# Patient Record
Sex: FEMALE | URBAN - NONMETROPOLITAN AREA
[De-identification: names, ages, dates, MRNs, and addresses within clinical notes are randomized per-mention and may not be internally consistent; named-entity substitution may affect disease eponyms.]

---

## 2022-12-15 ENCOUNTER — APPOINTMENT (OUTPATIENT)
Dept: URBAN - NONMETROPOLITAN AREA CLINIC 55 | Age: 27
Setting detail: DERMATOLOGY
End: 2022-12-15

## 2022-12-15 DIAGNOSIS — Z71.89 OTHER SPECIFIED COUNSELING: ICD-10-CM

## 2022-12-15 DIAGNOSIS — L23.9 ALLERGIC CONTACT DERMATITIS, UNSPECIFIED CAUSE: ICD-10-CM

## 2022-12-15 PROCEDURE — OTHER COUNSELING: OTHER

## 2022-12-15 PROCEDURE — OTHER MEDICATION COUNSELING: OTHER

## 2022-12-15 PROCEDURE — 99203 OFFICE O/P NEW LOW 30 MIN: CPT

## 2022-12-15 PROCEDURE — OTHER MIPS QUALITY: OTHER

## 2022-12-15 PROCEDURE — OTHER PATIENT SPECIFIC COUNSELING: OTHER

## 2022-12-15 PROCEDURE — OTHER PRESCRIPTION: OTHER

## 2022-12-15 RX ORDER — TRIAMCINOLONE ACETONIDE 1 MG/G
CREAM TOPICAL
Qty: 454 | Refills: 1 | Status: ERX | COMMUNITY
Start: 2022-12-15

## 2022-12-15 ASSESSMENT — LOCATION ZONE DERM
LOCATION ZONE: NECK
LOCATION ZONE: TRUNK
LOCATION ZONE: LEG

## 2022-12-15 ASSESSMENT — LOCATION DETAILED DESCRIPTION DERM
LOCATION DETAILED: RIGHT ANTERIOR PROXIMAL THIGH
LOCATION DETAILED: RIGHT SUPERIOR ANTERIOR NECK
LOCATION DETAILED: RIGHT BUTTOCK

## 2022-12-15 ASSESSMENT — LOCATION SIMPLE DESCRIPTION DERM
LOCATION SIMPLE: RIGHT THIGH
LOCATION SIMPLE: RIGHT ANTERIOR NECK
LOCATION SIMPLE: RIGHT BUTTOCK

## 2022-12-15 NOTE — PROCEDURE: MIPS QUALITY
Quality 226: Preventive Care And Screening: Tobacco Use: Screening And Cessation Intervention: Patient screened for tobacco use, is a smoker AND received Cessation Counseling within the Previous 12 Months
Detail Level: Detailed
Quality 431: Preventive Care And Screening: Unhealthy Alcohol Use - Screening: Patient not identified as an unhealthy alcohol user when screened for unhealthy alcohol use using a systematic screening method
Quality 130: Documentation Of Current Medications In The Medical Record: Current Medications Documented
Quality 110: Preventive Care And Screening: Influenza Immunization: Influenza immunization was not ordered or administered, reason not given

## 2022-12-15 NOTE — PROCEDURE: PATIENT SPECIFIC COUNSELING
Wash with Dove white bar unscented \\nUse All Free and Clear detergent\\nNo dryer sheets or fabric softener \\nTake an Allegra QAM\\nCan use Cerave moisturizing cream\\nPA gave patient personal contact number
Detail Level: Detailed

## 2022-12-15 NOTE — PROCEDURE: MEDICATION COUNSELING
Xelshortyz Pregnancy And Lactation Text: This medication is Pregnancy Category D and is not considered safe during pregnancy.  The risk during breast feeding is also uncertain.

## 2022-12-15 NOTE — HPI: RASH
What Type Of Note Output Would You Prefer (Optional)?: Standard Output
Is This A New Presentation, Or A Follow-Up?: Rash
Additional History: Saw Telemedicine doctor last week who prescribed steroids PO, which she completed with some improvement

## 2023-04-10 ENCOUNTER — APPOINTMENT (OUTPATIENT)
Dept: URBAN - NONMETROPOLITAN AREA CLINIC 55 | Age: 28
Setting detail: DERMATOLOGY
End: 2023-04-10

## 2023-04-10 DIAGNOSIS — L259 CONTACT DERMATITIS AND OTHER ECZEMA, UNSPECIFIED CAUSE: ICD-10-CM

## 2023-04-10 DIAGNOSIS — Z71.89 OTHER SPECIFIED COUNSELING: ICD-10-CM

## 2023-04-10 PROBLEM — L23.9 ALLERGIC CONTACT DERMATITIS, UNSPECIFIED CAUSE: Status: ACTIVE | Noted: 2023-04-10

## 2023-04-10 PROCEDURE — OTHER MIPS QUALITY: OTHER

## 2023-04-10 PROCEDURE — 99213 OFFICE O/P EST LOW 20 MIN: CPT

## 2023-04-10 PROCEDURE — OTHER MEDICATION COUNSELING: OTHER

## 2023-04-10 PROCEDURE — OTHER PATIENT SPECIFIC COUNSELING: OTHER

## 2023-04-10 PROCEDURE — OTHER PRESCRIPTION: OTHER

## 2023-04-10 PROCEDURE — OTHER COUNSELING: OTHER

## 2023-04-10 RX ORDER — METHYLPREDNISOLONE 4 MG/1
TABLET ORAL
Qty: 1 | Refills: 1 | Status: ERX | COMMUNITY
Start: 2023-04-10

## 2023-04-10 ASSESSMENT — LOCATION DETAILED DESCRIPTION DERM
LOCATION DETAILED: RIGHT BUTTOCK
LOCATION DETAILED: RIGHT LATERAL ABDOMEN

## 2023-04-10 ASSESSMENT — LOCATION SIMPLE DESCRIPTION DERM
LOCATION SIMPLE: ABDOMEN
LOCATION SIMPLE: RIGHT BUTTOCK

## 2023-04-10 ASSESSMENT — LOCATION ZONE DERM: LOCATION ZONE: TRUNK

## 2023-04-10 NOTE — PROCEDURE: PATIENT SPECIFIC COUNSELING
Consider using all free and clear detergent (samples)\\nUse CeraVe bar soap \\nNo dryer sheets, can use dryer balls\\nUse CeraVe moisturizing cream\\nPatient was worried that it is psoriasis like her aunt. PA reassured this does not clinically currently look like PSO, but if does not clear recommend shave biopsy to determine etiology \\nRecommended Allegra daily\\nPA gave personal contact information, patient is to give update by the end of the week\\nContinue using Triamcinolone cream on ?hot? spots\\nIf patient wants to see an allergist PCP can refer\\nWhen patient is due to have nails refilled, she should get them removed until rash is cleared.\\nPatient will dc Benadryl, continue with hydroxyzine as given by pscychiatrist\\nPatient quite flared and a bit distressed. Bc of this I will give systemic steroid to try and calm. Extensively Counselled regarding side effects of systemic steroid - psychosis, HTN, elevated glucose
Detail Level: Detailed
